# Patient Record
Sex: MALE | Race: BLACK OR AFRICAN AMERICAN
[De-identification: names, ages, dates, MRNs, and addresses within clinical notes are randomized per-mention and may not be internally consistent; named-entity substitution may affect disease eponyms.]

---

## 2019-07-21 ENCOUNTER — HOSPITAL ENCOUNTER (OUTPATIENT)
Dept: HOSPITAL 84 - D.ER | Age: 44
Setting detail: OBSERVATION
LOS: 1 days | Discharge: LEFT BEFORE DISCHARGE | End: 2019-07-22
Attending: INTERNAL MEDICINE | Admitting: INTERNAL MEDICINE
Payer: MEDICARE

## 2019-07-21 VITALS
HEIGHT: 75 IN | HEIGHT: 75 IN | BODY MASS INDEX: 29.84 KG/M2 | WEIGHT: 240 LBS | BODY MASS INDEX: 29.84 KG/M2 | WEIGHT: 240 LBS | BODY MASS INDEX: 29.84 KG/M2

## 2019-07-21 VITALS — DIASTOLIC BLOOD PRESSURE: 87 MMHG | SYSTOLIC BLOOD PRESSURE: 169 MMHG

## 2019-07-21 VITALS — SYSTOLIC BLOOD PRESSURE: 179 MMHG | DIASTOLIC BLOOD PRESSURE: 88 MMHG

## 2019-07-21 VITALS — DIASTOLIC BLOOD PRESSURE: 87 MMHG | SYSTOLIC BLOOD PRESSURE: 172 MMHG

## 2019-07-21 DIAGNOSIS — J81.1: ICD-10-CM

## 2019-07-21 DIAGNOSIS — E11.22: Primary | ICD-10-CM

## 2019-07-21 DIAGNOSIS — D63.1: ICD-10-CM

## 2019-07-21 DIAGNOSIS — I12.0: ICD-10-CM

## 2019-07-21 DIAGNOSIS — I25.10: ICD-10-CM

## 2019-07-21 DIAGNOSIS — N18.6: ICD-10-CM

## 2019-07-21 DIAGNOSIS — Z99.2: ICD-10-CM

## 2019-07-21 NOTE — NUR
PATIENT ARRIVED TO UNIT AT THIS TIME VIA GERNERY FROM ED AND ADMITTED TO ROOM
2133. PATIENT IS ALERT/ORIENTED. ABLE TO TRANSFER SELF. AV FISTULA TO LEFT
ARM. IV TO RIGHT AC. NO DISTRESS. DENIES CHEST PAIN. RESP EVEN AND UNLABORED.

## 2019-07-21 NOTE — NUR
RECIEVED UP IN BED WITH EYES OPEN AND TV ON. ALERT AND ORINTED X4. LEFT ARM
RESERVED D/T  AVF. 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 IV TO RIGHT AC
SL.. DENIES ANY NEEDS AT THIS TIME.

## 2019-07-22 VITALS — DIASTOLIC BLOOD PRESSURE: 106 MMHG | SYSTOLIC BLOOD PRESSURE: 201 MMHG

## 2019-07-22 VITALS — SYSTOLIC BLOOD PRESSURE: 201 MMHG | DIASTOLIC BLOOD PRESSURE: 99 MMHG

## 2019-07-22 VITALS — SYSTOLIC BLOOD PRESSURE: 187 MMHG | DIASTOLIC BLOOD PRESSURE: 98 MMHG

## 2019-07-22 VITALS — SYSTOLIC BLOOD PRESSURE: 240 MMHG | DIASTOLIC BLOOD PRESSURE: 75 MMHG

## 2019-07-22 VITALS — SYSTOLIC BLOOD PRESSURE: 196 MMHG | DIASTOLIC BLOOD PRESSURE: 96 MMHG

## 2019-07-22 LAB
ANION GAP SERPL CALC-SCNC: 17 MMOL/L (ref 8–16)
BASOPHILS NFR BLD AUTO: 0.2 % (ref 0–2)
BUN SERPL-MCNC: 68 MG/DL (ref 7–18)
CALCIUM SERPL-MCNC: 10.2 MG/DL (ref 8.5–10.1)
CHLORIDE SERPL-SCNC: 101 MMOL/L (ref 98–107)
CO2 SERPL-SCNC: 24.8 MMOL/L (ref 21–32)
CREAT SERPL-MCNC: 15.4 MG/DL (ref 0.6–1.3)
EOSINOPHIL NFR BLD: 2.6 % (ref 0–7)
ERYTHROCYTE [DISTWIDTH] IN BLOOD BY AUTOMATED COUNT: 15.7 % (ref 11.5–14.5)
GLUCOSE SERPL-MCNC: 110 MG/DL (ref 74–106)
HCT VFR BLD CALC: 29.1 % (ref 42–54)
HGB BLD-MCNC: 9.6 G/DL (ref 13.5–17.5)
IMM GRANULOCYTES NFR BLD: 0.1 % (ref 0–5)
LYMPHOCYTES NFR BLD AUTO: 12.2 % (ref 15–50)
MAGNESIUM SERPL-MCNC: 2.3 MG/DL (ref 1.8–2.4)
MCH RBC QN AUTO: 30 PG (ref 26–34)
MCHC RBC AUTO-ENTMCNC: 33 G/DL (ref 31–37)
MCV RBC: 90.9 FL (ref 80–100)
MONOCYTES NFR BLD: 5.5 % (ref 2–11)
NEUTROPHILS NFR BLD AUTO: 79.4 % (ref 40–80)
OSMOLALITY SERPL CALC.SUM OF ELEC: 296 MOSM/KG (ref 275–300)
PHOSPHATE SERPL-MCNC: 6.9 MG/DL (ref 2.5–4.9)
PLATELET # BLD: 277 10X3/UL (ref 130–400)
PMV BLD AUTO: 11.1 FL (ref 7.4–10.4)
POTASSIUM SERPL-SCNC: 4.8 MMOL/L (ref 3.5–5.1)
RBC # BLD AUTO: 3.2 10X6/UL (ref 4.2–6.1)
SODIUM SERPL-SCNC: 138 MMOL/L (ref 136–145)
WBC # BLD AUTO: 8.4 10X3/UL (ref 4.8–10.8)

## 2019-07-22 NOTE — NUR
B/P 201/99. PT STATES HE'S BEEN OUT OF HIS B/P MEDICATIONS. CALLED KATHLEEN GRANT ON CALL WITH NEW ORDER FOR CLONIDINE 0.1. HOUSE SUPERVISOR NOTIFIED AND
AWAITING MEDICATION.

## 2019-07-22 NOTE — NUR
RECEIVED PATIENT BACK FROM DIALYSIS AND HE REQUESTED TO BE DISCHARGED. I
EDUCATED HIM ON THE RISKS, BENEFITS AND ALTERNATIVES TO LEAVING AMA. PATIENT
SIGNED FORMS. EDUCATED PATIENT ON MEDICATIONS. DR. MARIA WAS PAGED AT 2015,
RECEIVED CALLBACK AND INFORMED HIM OF PATIENTS DECISION. PATIENT DISCHARGED.

## 2019-07-24 NOTE — MORECARE
CASE MANAGEMENT DISCHARGE SUMMARY
 
 
PATIENT: BENEDICTO WELDON                     UNIT: [id number]
ACCOUNT#: [id number]                       ADM DATE: 19
AGE: 44     : 75  SEX: M            ROOM/BED: D.2133    
AUTHOR: KRISTENDOC                             PHYSICIAN:                               
 
REFERRING PHYSICIAN: ADOLPH LIZAMA MD       
DATE OF SERVICE: 19
Discharge Plan
 
 
Patient Name: BENEDICTO WELDON
Facility: [facility]:[place]
Encounter #: [id number]
Medical Record #: [id number]
: 1975
Planned Disposition: Home
Anticipated Discharge Date: 19
 
Discharge Date: 2019
Expected LOS: 3
Initial Reviewer: [email or web address]
Initial Review Date: 2019
Generated: 19   4:08 pm 
  
 
 
 
 
 
 
 
Patient Name: BENEDICTO WELDON
 
Encounter #: [id number]
Page [id number]
 
 
 
 
 
 
 
 
**All edits/amendments must be made on the electronic document**
 
DICTATION DATE: 19 1508     : PARRISH  19 1508     
RPT#: [id number]                                ME DATE:19
                                               STATUS: DIS IN  
[facility]
[address] [place] [address]
***END OF REPORT***

## 2019-07-24 NOTE — MORECARE
CASE MANAGEMENT DISCHARGE SUMMARY
 
 
PATIENT: BENEDICTO WELDON                     UNIT: [id number]
ACCOUNT#: [id number]                       ADM DATE: 19
AGE: 44     : 75  SEX: M            ROOM/BED: D.2133    
AUTHOR: KRISTEN,DOC                             PHYSICIAN:                               
 
REFERRING PHYSICIAN: ADOLPH LIZAMA MD       
DATE OF SERVICE: 19
Discharge Plan
 
 
Patient Name: BENEDICTO WELDON
Facility: [facility]:[place]
Encounter #: [id number]
Medical Record #: [id number]
: 1975
Planned Disposition: Home
Anticipated Discharge Date: 19
 
Discharge Date: 2019
Expected LOS: 3
Initial Reviewer: [email or web address]
Initial Review Date: 2019
Generated: 19   4:24 pm 
 DCPIA - Discharge Planning Initial Assessment
 
Updated by [id number]: Geovanni John on 19   3:21 pm
*  Is the patient Alert and Oriented?
Yes
*  How many steps to enter\exit or inside your home? RAMP *  PCP LIZ TSANG *
 Pharmacy
[facility] [place]
*  Preadmission Environment
Home with Family
*  ADLs
Independent
*  Equipment
None
*  Other Equipment
[facility]
*  List name and contact numbers for known caregivers / representatives who 
currently or will assist patient after discharge:
KATIE KIMBROUGH, MOM, [phone]
*  Verbal permission to speak to the caregivers and representatives has been 
obtained from the patient.
Yes
*  Community resources currently utilized
None
 
*  Please name any agencies selected above.
NONE
*  Additional services required to return to the preadmission environment?
No
*  Can the patient safely return to the preadmission environment?
Yes
*  Has this patient been hospitalized within the prior 30 days at any 
hospital?
No
 
 
 
 
 
 
 
Last DP export: 19   2:09 p
Patient Name: BENEDICTO WELDON
Encounter #: [id number]
Page [id number]
 
 
 
 
 
 
 
 
**All edits/amendments must be made on the electronic document**
 
DICTATION DATE: 19 1524     : PARRISH  19 1524     
RPT#: [id number]                                DC DATE:19
                                               STATUS: DIS IN  
[facility]
[address] [place] [address]
***END OF REPORT***

## 2019-07-24 NOTE — MORECARE
CASE MANAGEMENT DISCHARGE SUMMARY
 
 
PATIENT: BENEDICTO WELDON                     UNIT: [id number]
ACCOUNT#: [id number]                       ADM DATE: 19
AGE: 44     : 75  SEX: M            ROOM/BED: D.2133    
AUTHOR: BLAIR PETERSON                             PHYSICIAN:                               
 
REFERRING PHYSICIAN: ADOLPH LIZAMA MD       
DATE OF SERVICE: 19
Discharge Plan
 
 
Patient Name: BENEDICTO WELDON
Facility: [facility]:[place]
Encounter #: [id number]
Medical Record #: [id number]
: 1975
Planned Disposition: Left Against Medical Advice
Anticipated Discharge Date: 19
 
Discharge Date: 2019
Expected LOS: 1
Initial Reviewer: [email or web address]
Initial Review Date: 2019
Generated: 19   4:50 pm 
  
 
 
 
 
 
 
 
 
Last DP export: 19   2:25 p
Patient Name: BENEDICTO WELDON
 
Encounter #: [id number]
Page [id number]
 
 
 
 
 
Electronically Signed by BLAIR PETERSON on 19 at 1550
 
 
 
 
 
 
**All edits/amendments must be made on the electronic document**
 
DICTATION DATE: 19 1550     : PARRISH  19 1550     
RPT#: [id number]                                DC DATE:19
                                               STATUS: DIS IN  
[facility]
[address] [place] [address]
***END OF REPORT***

## 2020-01-05 ENCOUNTER — HOSPITAL ENCOUNTER (INPATIENT)
Dept: HOSPITAL 84 - D.ER | Age: 45
LOS: 4 days | Discharge: HOME | DRG: 193 | End: 2020-01-09
Attending: INTERNAL MEDICINE | Admitting: INTERNAL MEDICINE
Payer: MEDICARE

## 2020-01-05 VITALS — DIASTOLIC BLOOD PRESSURE: 87 MMHG | SYSTOLIC BLOOD PRESSURE: 183 MMHG

## 2020-01-05 VITALS
HEIGHT: 75 IN | WEIGHT: 215 LBS | BODY MASS INDEX: 26.73 KG/M2 | BODY MASS INDEX: 26.73 KG/M2 | BODY MASS INDEX: 26.73 KG/M2 | WEIGHT: 215 LBS | HEIGHT: 75 IN

## 2020-01-05 VITALS — SYSTOLIC BLOOD PRESSURE: 199 MMHG | DIASTOLIC BLOOD PRESSURE: 102 MMHG

## 2020-01-05 VITALS — SYSTOLIC BLOOD PRESSURE: 203 MMHG | DIASTOLIC BLOOD PRESSURE: 99 MMHG

## 2020-01-05 VITALS — DIASTOLIC BLOOD PRESSURE: 95 MMHG | SYSTOLIC BLOOD PRESSURE: 184 MMHG

## 2020-01-05 VITALS — SYSTOLIC BLOOD PRESSURE: 159 MMHG | DIASTOLIC BLOOD PRESSURE: 74 MMHG

## 2020-01-05 DIAGNOSIS — J96.01: ICD-10-CM

## 2020-01-05 DIAGNOSIS — N18.6: ICD-10-CM

## 2020-01-05 DIAGNOSIS — J18.9: Primary | ICD-10-CM

## 2020-01-05 DIAGNOSIS — Z99.2: ICD-10-CM

## 2020-01-05 DIAGNOSIS — I12.0: ICD-10-CM

## 2020-01-05 DIAGNOSIS — D63.1: ICD-10-CM

## 2020-01-05 DIAGNOSIS — E11.22: ICD-10-CM

## 2020-01-05 LAB
ALBUMIN SERPL-MCNC: 3 G/DL (ref 3.4–5)
ALP SERPL-CCNC: 106 U/L (ref 46–116)
ALT SERPL-CCNC: 34 U/L (ref 10–68)
ANION GAP SERPL CALC-SCNC: 16.3 MMOL/L (ref 8–16)
APTT BLD: 32.4 SECONDS (ref 22.8–39.4)
BASOPHILS NFR BLD AUTO: 0.5 % (ref 0–2)
BILIRUB SERPL-MCNC: 0.71 MG/DL (ref 0.2–1.3)
BUN SERPL-MCNC: 32 MG/DL (ref 7–18)
CALCIUM SERPL-MCNC: 9.4 MG/DL (ref 8.5–10.1)
CHLORIDE SERPL-SCNC: 99 MMOL/L (ref 98–107)
CK MB SERPL-MCNC: 1.9 U/L (ref 0–3.6)
CK SERPL-CCNC: 170 UL (ref 21–232)
CO2 SERPL-SCNC: 25.6 MMOL/L (ref 21–32)
CREAT SERPL-MCNC: 8.8 MG/DL (ref 0.6–1.3)
EOSINOPHIL NFR BLD: 3.6 % (ref 0–7)
ERYTHROCYTE [DISTWIDTH] IN BLOOD BY AUTOMATED COUNT: 17.1 % (ref 11.5–14.5)
GLOBULIN SER-MCNC: 6 G/L
GLUCOSE SERPL-MCNC: 136 MG/DL (ref 74–106)
HCT VFR BLD CALC: 30.7 % (ref 42–54)
HGB BLD-MCNC: 9.5 G/DL (ref 13.5–17.5)
IMM GRANULOCYTES NFR BLD: 0.2 % (ref 0–5)
INR PPP: 1.29 (ref 0.85–1.17)
LYMPHOCYTES NFR BLD AUTO: 10.1 % (ref 15–50)
MCH RBC QN AUTO: 26.6 PG (ref 26–34)
MCHC RBC AUTO-ENTMCNC: 30.9 G/DL (ref 31–37)
MCV RBC: 86 FL (ref 80–100)
MONOCYTES NFR BLD: 4.8 % (ref 2–11)
NEUTROPHILS NFR BLD AUTO: 80.8 % (ref 40–80)
OSMOLALITY SERPL CALC.SUM OF ELEC: 282 MOSM/KG (ref 275–300)
PLATELET # BLD: 492 10X3/UL (ref 130–400)
PMV BLD AUTO: 10.2 FL (ref 7.4–10.4)
POTASSIUM SERPL-SCNC: 3.9 MMOL/L (ref 3.5–5.1)
PROT SERPL-MCNC: 9 G/DL (ref 6.4–8.2)
PROTHROMBIN TIME: 15.5 SECONDS (ref 11.6–15)
RBC # BLD AUTO: 3.57 10X6/UL (ref 4.2–6.1)
SODIUM SERPL-SCNC: 137 MMOL/L (ref 136–145)
TROPONIN I SERPL-MCNC: 0.06 NG/ML (ref 0–0.06)
WBC # BLD AUTO: 13.2 10X3/UL (ref 4.8–10.8)

## 2020-01-05 PROCEDURE — 5A1D70Z PERFORMANCE OF URINARY FILTRATION, INTERMITTENT, LESS THAN 6 HOURS PER DAY: ICD-10-PCS | Performed by: INTERNAL MEDICINE

## 2020-01-05 NOTE — NUR
PATIENT IS UP ROLLING AROUND IN HIS WHEEL CHAIR. HIS MOTHER IS PUSHING HIM
AROUND. HE SAID THAT HE HAD ANXIETY. HE STATES THAT IS FEELS BETTER WHEN HE IS
OUT OF THE ROOM.

## 2020-01-05 NOTE — NUR
REPORT RECEIVED. PT UP AMBULATING IN KIMBLE, RR EVEN AND UNLABORED. MOTHER
PRESENT. NO S/SX OF DISTRESS OBSERVED AT THIS TIME. WILL CTM.

## 2020-01-05 NOTE — NUR
PATIENT B/P ELEVATED. APRESOLINE GIVEN PRN AS ORDERED AT 0600. IT IS ORDERED
Q4 HOURS. HE HAS A LOT OF ANXIETY, AND REPORTS THAT THE BLOOD DRAW IS WHAT IS
MAKING HIS B/P GO UP. HE WAS VOMITING THIS MORNING AND TREATED WITH ZOFRAN. HE
IS UNHAPPY AND WANTS TO ROLL AROUND THE FLOOR BY WHEEL CHAIR. I TOLD THE
PATIENT AND HIS PAMILY THAT HE SHOULD STAY IN THE ROOM UNTIL THE APRN ROUNDS.
CALLED RICHARD العلي AND SHE IS REVIEWING [facility] MEDICATIONS NOW.

## 2020-01-05 NOTE — NUR
HIGH B/P TREATED AGAIN WITH PRN B/P MEDS. CALLED RICHARD العلي AND REPORTED
PATIENT ANXIETY. SHE ACKNOWLEDGED AND ADDITIONAL MEDICATION ORDERED FOR
ANXIETY. CALLED HOUSE SUPERVISOR TO PULL IT BECAUSE IT IS AFTER PHARMACY
CLOSED. PATIENT IS SITTING UP IN BED AND WAITING FOR THE ANTIBIOTICS TO
FINISH, THEN HE SAYS THAT HE WANTS TO RIDE AROUND THE FLOOR IN HIS WHEEL CHAIR
AGAIN. MOTHER AT BEDSIDE.

## 2020-01-06 VITALS — DIASTOLIC BLOOD PRESSURE: 90 MMHG | SYSTOLIC BLOOD PRESSURE: 176 MMHG

## 2020-01-06 VITALS — DIASTOLIC BLOOD PRESSURE: 92 MMHG | SYSTOLIC BLOOD PRESSURE: 183 MMHG

## 2020-01-06 VITALS — SYSTOLIC BLOOD PRESSURE: 185 MMHG | DIASTOLIC BLOOD PRESSURE: 91 MMHG

## 2020-01-06 LAB
ANION GAP SERPL CALC-SCNC: 13.8 MMOL/L (ref 8–16)
BASOPHILS NFR BLD AUTO: 0.4 % (ref 0–2)
BUN SERPL-MCNC: 43 MG/DL (ref 7–18)
CALCIUM SERPL-MCNC: 9.2 MG/DL (ref 8.5–10.1)
CHLORIDE SERPL-SCNC: 101 MMOL/L (ref 98–107)
CO2 SERPL-SCNC: 27.1 MMOL/L (ref 21–32)
CREAT SERPL-MCNC: 11.5 MG/DL (ref 0.6–1.3)
EOSINOPHIL NFR BLD: 5.1 % (ref 0–7)
ERYTHROCYTE [DISTWIDTH] IN BLOOD BY AUTOMATED COUNT: 17.5 % (ref 11.5–14.5)
GLUCOSE SERPL-MCNC: 150 MG/DL (ref 74–106)
HCT VFR BLD CALC: 26.7 % (ref 42–54)
HGB BLD-MCNC: 8.3 G/DL (ref 13.5–17.5)
IMM GRANULOCYTES NFR BLD: 0.3 % (ref 0–5)
LYMPHOCYTES NFR BLD AUTO: 11.4 % (ref 15–50)
MAGNESIUM SERPL-MCNC: 2.5 MG/DL (ref 1.8–2.4)
MCH RBC QN AUTO: 26.5 PG (ref 26–34)
MCHC RBC AUTO-ENTMCNC: 31.1 G/DL (ref 31–37)
MCV RBC: 85.3 FL (ref 80–100)
MONOCYTES NFR BLD: 5 % (ref 2–11)
NEUTROPHILS NFR BLD AUTO: 77.8 % (ref 40–80)
OSMOLALITY SERPL CALC.SUM OF ELEC: 289 MOSM/KG (ref 275–300)
PHOSPHATE SERPL-MCNC: 5.3 MG/DL (ref 2.5–4.9)
PLATELET # BLD: 421 10X3/UL (ref 130–400)
PMV BLD AUTO: 10.4 FL (ref 7.4–10.4)
POTASSIUM SERPL-SCNC: 3.9 MMOL/L (ref 3.5–5.1)
RBC # BLD AUTO: 3.13 10X6/UL (ref 4.2–6.1)
SODIUM SERPL-SCNC: 138 MMOL/L (ref 136–145)
TROPONIN I SERPL-MCNC: 0.07 NG/ML (ref 0–0.06)
VANCOMYCIN SERPL-MCNC: 0 UG/ML (ref 10–20)
WBC # BLD AUTO: 9.1 10X3/UL (ref 4.8–10.8)

## 2020-01-07 VITALS — SYSTOLIC BLOOD PRESSURE: 159 MMHG | DIASTOLIC BLOOD PRESSURE: 77 MMHG

## 2020-01-07 VITALS — SYSTOLIC BLOOD PRESSURE: 154 MMHG | DIASTOLIC BLOOD PRESSURE: 70 MMHG

## 2020-01-07 VITALS — DIASTOLIC BLOOD PRESSURE: 90 MMHG | SYSTOLIC BLOOD PRESSURE: 171 MMHG

## 2020-01-07 VITALS — SYSTOLIC BLOOD PRESSURE: 201 MMHG | DIASTOLIC BLOOD PRESSURE: 97 MMHG

## 2020-01-07 LAB
TROPONIN I SERPL-MCNC: 0.07 NG/ML (ref 0–0.06)
VANCOMYCIN SERPL-MCNC: 18.4 UG/ML (ref 10–20)

## 2020-01-07 NOTE — NUR
REPORT RECEIVED AND PATIENT CARE ASSUMED. PATIENT LAHYING IN BED AWAKE, ALERT
ORIENTED X 4. PATIENT IS STABLE AND VSS. PATIENT DENIES ANY NEEDES OR PAIN.
WILL CONTINUE TO MONTIOR. WIFE AT BS. SR UP X 2 BED IN LOW POSITION AND CALL
LIGHT IN REACH.

## 2020-01-07 NOTE — NUR
I CALLED GEOVANNI CARBALLO, RN VASCULAR NURSE FOR TRACY VELÁSQUEZ, RN PRIMARY NURSE FOR
A PIV. STATES THAT SHE WILL PLACE ONE.

## 2020-01-08 VITALS — DIASTOLIC BLOOD PRESSURE: 94 MMHG | SYSTOLIC BLOOD PRESSURE: 195 MMHG

## 2020-01-08 VITALS — DIASTOLIC BLOOD PRESSURE: 80 MMHG | SYSTOLIC BLOOD PRESSURE: 171 MMHG

## 2020-01-08 VITALS — DIASTOLIC BLOOD PRESSURE: 100 MMHG | SYSTOLIC BLOOD PRESSURE: 182 MMHG

## 2020-01-08 VITALS — DIASTOLIC BLOOD PRESSURE: 68 MMHG | SYSTOLIC BLOOD PRESSURE: 128 MMHG

## 2020-01-08 VITALS — SYSTOLIC BLOOD PRESSURE: 165 MMHG | DIASTOLIC BLOOD PRESSURE: 79 MMHG

## 2020-01-08 VITALS — SYSTOLIC BLOOD PRESSURE: 192 MMHG | DIASTOLIC BLOOD PRESSURE: 94 MMHG

## 2020-01-08 LAB
APPEARANCE UR: CLEAR
BILIRUB SERPL-MCNC: NEGATIVE MG/DL
COLOR UR: YELLOW
GLUCOSE SERPL-MCNC: NEGATIVE MG/DL
KETONES UR STRIP-MCNC: NEGATIVE MG/DL
NITRITE UR-MCNC: NEGATIVE MG/ML
PH UR STRIP: 8 [PH] (ref 5–6)
PROT UR-MCNC: (no result) MG/DL
SP GR UR STRIP: 1.01 (ref 1–1.02)
TROPONIN I SERPL-MCNC: 0.06 NG/ML (ref 0–0.06)
UROBILINOGEN UR-MCNC: NORMAL MG/DL
VANCOMYCIN SERPL-MCNC: 14.9 UG/ML (ref 10–20)

## 2020-01-08 NOTE — MORECARE
CASE MANAGEMENT DISCHARGE SUMMARY
 
 
PATIENT: BENEDICTO WELDON                     UNIT: [id number]
ACCOUNT#: [id number]                       ADM DATE: 20
AGE: 44     : 75  SEX: M            ROOM/BED: D.9793    
AUTHOR: KRISTEN,DOC                             PHYSICIAN:                               
 
REFERRING PHYSICIAN: SUSAN CHAMORRO DO            
DATE OF SERVICE: 20
Discharge Plan
 
 
Patient Name: BENEDICTO WELDON
Facility: [facility]:[place]
Encounter #: [id number]
Medical Record #: [id number]
: 1975
Planned Disposition: Home
Anticipated Discharge Date: 20
 
Discharge Date: 
Expected LOS: 4
Initial Reviewer: [email or web address]
Initial Review Date: 2020
Generated: 20   5:47 pm 
Comments
 
DCP- Discharge Planning
 
Updated by [id number]: Geovanni John on 20   3:41 pm CT
Patient Name: BENEDICTO WELDON                                     
Admission Status: ER   
Accout number: [id number]                              
Admission Date: 2020   
: 1975                                                        
Admission Diagnosis:   
Attending: MIKE                                                
Current LOS:  3   
  
Anticipated DC Date: 2020   
Planned Disposition: Home   
Primary Insurance: MEDICARE A & B   
  
  
Discharge Planning Comments:   
CM RECEIVED OXYGEN TESTING ORDER AND OXYGEN TESTING OF 87% ON ROOM AIR.  CM 
MET WITH PT IN ROOM TO DISCUSS DISCHARGE PLANNING AND NEEDS. PT REPORTS 
LIVING AT HOME INDEPENDENTLY WITH HIS MOTHER. PT HAS WHEELCHAIR AND LEFT 
BELOW KNEE PROSTESIS; PT'S PREFERRED MEDICAL EQUIPMENT PROVIDER IS [employer]. PT HAS NO OUTSIDE SERVICES ASSISTING IN THE HOME. PT DRIVES 
SELF TO DIALYSIS IN [facility] AT 1030AM.  CM DISCUSSED AVAILABILITY OF 
HOME HEALTH, REHAB SERVICES AND MEDICAL EQUIPMENT. PT DENIES DISCHARGE NEEDS 
OTHER THAN OXYGEN.  CM REVIEWED CHART, EXPLAINED TO PT HE DOES NOT HAVE 
QUALIFYING DIAGNOSIS FOR OXYGEN AND MAY HAVE TO PAY PRIVATELY.  PT REPORTS 
UNDERSTANDING.  CHOICE SIGNED FOR [employer].  PT REPORTS FAMILY 
WILL PICK HIM UP FOR DISCHARGE HOME. IMPORTANT MESSAGE FROM MEDICARE PROVIDED 
AND EXPLAINED.  
  
CM CALLED [employer], [phone], SPOKE TO ALFONSO WHO REPORTS 
THEY DO NOT DO PRIVATE PAY OXYGEN.  ALFONSO SUGGESTED CM CALL [facility] 
RESPIRATORY.  CM CALLED [facility], [phone], SPOKE TO 
JESS WHO INFORMED CM THAT THEY DO PRIVATE PAY OXYGEN BUT MAY BE ABLE TO GET 
INSURANCE APPROVAL AND WANTS TO REVIEW CHART.  CM SPOKE TO PT IN ROOM WHO 
CONSENTED TO REFERRAL TO [facility].  CM FAXED REFERRAL TO 
JESS [phone].  
  
CM WAITING [facility] RESPIRATORY TO COMPLETE REVIEW AND IF POSSIBLE, 
BILL MEDICARE FOR OXYGEN AND IF NOT, WORK OUT PRIVATE PAY ARRANGEMENT FOR 
HOME AND PORTABLE OXGYEN WITH PT.    
  
: Geovanni John
 DCPIA - Discharge Planning Initial Assessment
 
Updated by [id number]: Geovanni John on 20   4:23 pm
*  Is the patient Alert and Oriented?
Yes
*  How many steps to enter\exit or inside your home? NONE *  PCP DR GOLDMAN IN [place] * 
Pharmacy
[facility] IN [place]
*  Preadmission Environment
Home with Family
*  ADLs
Independent
*  Equipment
Wheelchair
*  Other Equipment
LEFT BELOW KNEE PROSTESIS
*  List name and contact numbers for known caregivers / representatives who 
currently or will assist patient after discharge:
MARTÍNEZ MCRAE, MOTHER, [phone]
*  Verbal permission to speak to the caregivers and representatives has been 
obtained from the patient.
N/A
*  Community resources currently utilized
Other
*  Please name any agencies selected above.
OUTPATIENT DIALYSIS, [place], MWF, 1030, DRIVES SELF
*  Additional services required to return to the preadmission environment?
Yes
*  Can the patient safely return to the preadmission environment?
Yes
 
*  Has this patient been hospitalized within the prior 30 days at any 
hospital?
No
 
External Providers
External Provider: [facility]
 
Next Contact Date: 2020
Service Request Date: 
Service Type: 
Resolution: 
 
Reviewer: 
Comments: 
 
 
 
 
 
 
Last DP export: 20   3:25 pm
Patient Name: BENEDICTO WELDON
Encounter #: [id number]
Page [id number]
 
 
 
 
 
Electronically Signed by BLAIR PETERSON on 20 at 1648
 
 
 
 
 
 
**All edits/amendments must be made on the electronic document**
 
DICTATION DATE: 20     : PARRISH  20     
RPT#: [id number]                                DC DATE:        
                                               STATUS: ADM IN  
[facility]
[address] [place] [address]
***END OF REPORT***

## 2020-01-08 NOTE — NUR
REPORT RECIEVED AND ROUNDING COMPLETE. PATIENT SITTING IN BED, PATIENT ASKED
ABOUT A SANDWICH, KATIE, HOUSE SUPERVISOR BROUGHT IN A SANDWICH. PATIENT SHOWS
NO S/SX OF DISTRESS AT THIS TIME. CALL LIGHT WITH IN REACH AND BED IN LOWEST
LOCKED POSITION. PAITENT STATES HE HAS NO OTHER NEEDS AT THIS TIME.

## 2020-01-08 NOTE — MORECARE
CASE MANAGEMENT DISCHARGE SUMMARY
 
 
PATIENT: BENEDICTO WELDON                     UNIT: [id number]
ACCOUNT#: [id number]                       ADM DATE: 20
AGE: 44     : 75  SEX: M            ROOM/BED: D.2103    
AUTHOR: BLAIR PETERSON                             PHYSICIAN:                               
 
REFERRING PHYSICIAN: SUSAN CHAMORRO DO            
DATE OF SERVICE: 20
Discharge Plan
 
 
Patient Name: BENEDICTO WELDON
Facility: [facility]:[place]
Encounter #: [id number]
Medical Record #: [id number]
: 1975
Planned Disposition: Home
Anticipated Discharge Date: 20
 
Discharge Date: 
Expected LOS: 4
Initial Reviewer: [email or web address]
Initial Review Date: 2020
Generated: 20   5:24 pm 
 DCPIA - Discharge Planning Initial Assessment
 
Updated by [id number]: Geovanni John on 20   4:23 pm
*  Is the patient Alert and Oriented?
Yes
*  How many steps to enter\exit or inside your home? NONE *  PCP DR GOLDMAN IN [place] * 
Pharmacy
[facility] IN [place]
*  Preadmission Environment
Home with Family
*  ADLs
Independent
*  Equipment
Wheelchair
*  Other Equipment
LEFT BELOW KNEE PROSTESIS
*  List name and contact numbers for known caregivers / representatives who 
currently or will assist patient after discharge:
MARTÍNEZ MCRAE, MOTHER, [phone]
*  Verbal permission to speak to the caregivers and representatives has been 
obtained from the patient.
N/A
*  Community resources currently utilized
Other
 
*  Please name any agencies selected above.
OUTPATIENT DIALYSIS, [place], MWF, 1030, DRIVES SELF
*  Additional services required to return to the preadmission environment?
Yes
*  Can the patient safely return to the preadmission environment?
Yes
*  Has this patient been hospitalized within the prior 30 days at any 
hospital?
No
 
 
 
 
 
 
Patient Name: BENEDICTO WELDON
Encounter #: [id number]
Page [id number]
 
 
 
 
 
Electronically Signed by BLAIR PETERSON on 20 at 1625
 
 
 
 
 
 
**All edits/amendments must be made on the electronic document**
 
DICTATION DATE: 20     : PARRISH  20     
RPT#: [id number]                                DC DATE:        
                                               STATUS: ADM IN  
[facility]
[address] [place] [address]
***END OF REPORT***

## 2020-01-09 VITALS — SYSTOLIC BLOOD PRESSURE: 177 MMHG | DIASTOLIC BLOOD PRESSURE: 91 MMHG

## 2020-01-09 VITALS — SYSTOLIC BLOOD PRESSURE: 190 MMHG | DIASTOLIC BLOOD PRESSURE: 91 MMHG

## 2020-01-09 VITALS — DIASTOLIC BLOOD PRESSURE: 78 MMHG | SYSTOLIC BLOOD PRESSURE: 185 MMHG

## 2020-01-09 LAB
TROPONIN I SERPL-MCNC: 0.05 NG/ML (ref 0–0.06)
VANCOMYCIN SERPL-MCNC: 16.1 UG/ML (ref 10–20)

## 2020-01-09 NOTE — NUR
REPORT RECEIVED FROM NIGHT SHIFT AND PATIENT DARNELL ASSUMED. PATIENT LAYING IN
BED ON BACK WITH EYES CLOSED AND BREATHING EVENLY. WILL CONTINUE WITH PLAN OF
CARE. SR UP X 2 BED IN LOW POSITION AND CALL LIGHT IN REACH.

## 2020-01-09 NOTE — NUR
Nutrition Follow-up:
Pt reports appetite improving. Ate ~75% of breakfast this AM.
Diet: Renal ADA
Wt: 215# (1/6)
Last BM: PTA
Labs reviewed
Meds noted: Renagel
-Continue current diet as tolerated.
-Need new wt; noted daily wts ordered.
-RD following.

## 2020-01-09 NOTE — NUR
PATIENT IS STABLE AND VSS. ORDERS RECEIVED FOR DC. WRITTEN AND VERBAL
INSTRUCTIONS GIVEN. PATIENT VERBAIZED UNDERSTANDING AND SIGNED DC ORDERS.
PATIENT IS NOW AWAINTING TRANSPORTATION FROM [place].

## 2020-01-09 NOTE — NUR
PATIENT IS STABLE AND VSS. IV DECD WITHOUT DIFFICULTY WITH ENTIRE CATHETER
INTACT. PRESSURE DRSG APPLIED. PATIENT TO FRONT DOOR VIA WC ACCOMPANIED BY
FAMILY AND HOSPITAL STAFF TO PRIVATE VEHICLE DRIVEN BY FAMILY MEMBER. PATIENT
IS DC HOME FOR SELF CARE.

## 2020-01-09 NOTE — MORECARE
CASE MANAGEMENT DISCHARGE SUMMARY
 
 
PATIENT: BENEDICTO WELDON                     UNIT: [id number]
ACCOUNT#: [id number]                       ADM DATE: 20
AGE: 44     : 75  SEX: M            ROOM/BED: D.2103    
AUTHOR: KRISTEN,DOC                             PHYSICIAN:                               
 
REFERRING PHYSICIAN: SUSAN CHAMORRO DO            
DATE OF SERVICE: 20
Discharge Plan
 
 
Patient Name: BENEDICTO WELDON
Facility: [facility]:[place]
Encounter #: [id number]
Medical Record #: [id number]
: 1975
Planned Disposition: Home
Anticipated Discharge Date: 20
 
Discharge Date: 
Expected LOS: 4
Initial Reviewer: [email or web address]
Initial Review Date: 2020
Generated: 20   1:13 pm 
Comments
 
DCP- Discharge Planning
 
Updated by [id number]: Geovanni John on 20  11:10 am CT
Patient Name:  BENEDICTO WELDNO   
Encounter No:  [id number]   
:  1975   
Primary Insurance:  MEDICARE A & B  
Anticipated DC Date: 2020   
Planned Disposition:  Home  
  
  
DCP follow-up note: CM CALLED [facility] RESIRATORY, [phone], SPOKE 
TO JESS WHO INFORMED CM THAT SHE RECEIVED INSURANCE APPROVAL AND OXYGEN IS 
ON THE WAY TO PT'S ROOM.  CM SPOKE TO PT IN ROOM WHO IS IN AGREEMENT WITH 
DISCHARGE HOME TODAY, FAMILY WILL .   NURSE NOTIFIED. 
  
[facility] RESPIRATORYDELIVERED PORTABLE OXYGEN TO PT'S ROOM AND WILL 
ARRANGE HOME DELIVERY OF OXGYEN WITH PT AFTER HE ARRIVES HOME TODAY.    
  
: Geovanni John
DCP- Discharge Planning
 
 
Updated by [id number]: Geovanni John on 20   3:41 pm CT
Patient Name: BENEDICTO WELDON                                     
Admission Status: ER   
Accout number: [id number]                              
Admission Date: 2020   
: 1975                                                        
Admission Diagnosis:   
Attending: MIKE                                                
Current LOS:  3   
  
Anticipated DC Date: 2020   
Planned Disposition: Home   
Primary Insurance: MEDICARE A & B   
  
  
Discharge Planning Comments:   
CM RECEIVED OXYGEN TESTING ORDER AND OXYGEN TESTING OF 87% ON ROOM AIR.  CM 
MET WITH PT IN ROOM TO DISCUSS DISCHARGE PLANNING AND NEEDS. PT REPORTS 
LIVING AT HOME INDEPENDENTLY WITH HIS MOTHER. PT HAS WHEELCHAIR AND LEFT 
BELOW KNEE PROSTESIS; PT'S PREFERRED MEDICAL EQUIPMENT PROVIDER IS [employer] 
[employer]. PT HAS NO OUTSIDE SERVICES ASSISTING IN THE HOME. PT DRIVES 
SELF TO DIALYSIS IN [facility] AT 1030AM.  CM DISCUSSED AVAILABILITY OF 
HOME HEALTH, REHAB SERVICES AND MEDICAL EQUIPMENT. PT DENIES DISCHARGE NEEDS 
OTHER THAN OXYGEN.  CM REVIEWED CHART, EXPLAINED TO PT HE DOES NOT HAVE 
QUALIFYING DIAGNOSIS FOR OXYGEN AND MAY HAVE TO PAY PRIVATELY.  PT REPORTS 
UNDERSTANDING.  CHOICE SIGNED FOR [employer].  PT REPORTS FAMILY 
WILL PICK HIM UP FOR DISCHARGE HOME. IMPORTANT MESSAGE FROM MEDICARE PROVIDED 
AND EXPLAINED.  
  
CM CALLED [employer], [phone], SPOKE TO ALFONSO WHO REPORTS 
THEY DO NOT DO PRIVATE PAY OXYGEN.  ALFONSO SUGGESTED CM CALL [facility] 
RESPIRATORY.  CM CALLED [facility], [phone], SPOKE TO 
JESS WHO INFORMED CM THAT THEY DO PRIVATE PAY OXYGEN BUT MAY BE ABLE TO GET 
INSURANCE APPROVAL AND WANTS TO REVIEW CHART.  CM SPOKE TO PT IN ROOM WHO 
CONSENTED TO REFERRAL TO [facility] RESPIRATORY.  CM FAXED REFERRAL TO 
JESS [phone].  
  
CM WAITING [facility] RESPIRATORY TO COMPLETE REVIEW AND IF POSSIBLE, 
BILL MEDICARE FOR OXYGEN AND IF NOT, WORK OUT PRIVATE PAY ARRANGEMENT FOR 
HOME AND PORTABLE OXGYEN WITH PT.    
  
: Geovanni John
 DCPIA - Discharge Planning Initial Assessment
 
Updated by [id number]: Geovanni John on 20   4:23 pm
*  Is the patient Alert and Oriented?
Yes
*  How many steps to enter\exit or inside your home? NONE *  PCP DR GOLDMAN IN [place] * 
Pharmacy
[facility] IN [place]
*  Preadmission Environment
Home with Family
 
*  ADLs
Independent
*  Equipment
Wheelchair
*  Other Equipment
LEFT BELOW KNEE PROSTESIS
*  List name and contact numbers for known caregivers / representatives who 
currently or will assist patient after discharge:
MARTÍNEZ MCRAE, MOTHER, [phone]
*  Verbal permission to speak to the caregivers and representatives has been 
obtained from the patient.
N/A
*  Community resources currently utilized
Other
*  Please name any agencies selected above.
OUTPATIENT DIALYSIS, [facility], [facility], 1030, DRIVES SELF
*  Additional services required to return to the preadmission environment?
Yes
*  Can the patient safely return to the preadmission environment?
Yes
*  Has this patient been hospitalized within the prior 30 days at any 
hospital?
No
 
 
 
 
 
Coverage Notice
 
Reviewer: [email or web address] Floresita John
 
Notice Issued Date-Time: 2020  15:00
Notice Type: Patient Choice Letter
 
Notice Delivered To: Patient
Relationship to Patient: 
Representative Name: 
 
Delivery Method: HAND - Hand Delivered
Shazia Days:
Prior Verbal Notification: 
 
Recipient Understood Notice: Yes
Recipient Signature: Yes
Med Rec Note Co-signed by Attending:
 
Coverage Notice Comment:  [employer]
Reviewer: [email or web address] Floresita John
 
Notice Issued Date-Time: 2020  15:00
Notice Type: IM Discharge Notice
 
 
Notice Delivered To: Patient
Relationship to Patient: 
Representative Name: 
 
Delivery Method: HAND - Hand Delivered
Shazia Days:
Prior Verbal Notification: 
 
Recipient Understood Notice: Yes
Recipient Signature: Yes
Med Rec Note Co-signed by Attending:
 
Coverage Notice Comment:  
 
Last DP export: 20   3:48 pm
Patient Name: BENEDICTO WELDON
Encounter #: [id number]
Page [phone]
 
 
 
 
 
Electronically Signed by BLAIR PETERSON on 20 at 1213
 
 
 
 
 
 
**All edits/amendments must be made on the electronic document**
 
DICTATION DATE: 20 1213     : PARRISH  20 1213     
RPT#: [id number]                                DC DATE:        
                                               STATUS: ADM IN  
[facility]
[address] [place] [address]
***END OF REPORT*** Gastroenterology

## 2020-01-12 ENCOUNTER — HOSPITAL ENCOUNTER (INPATIENT)
Dept: HOSPITAL 84 - D.ER | Age: 45
LOS: 3 days | Discharge: HOME | DRG: 682 | End: 2020-01-15
Attending: FAMILY MEDICINE | Admitting: FAMILY MEDICINE
Payer: MEDICARE

## 2020-01-12 VITALS
WEIGHT: 215 LBS | HEIGHT: 75 IN | BODY MASS INDEX: 26.73 KG/M2 | WEIGHT: 215 LBS | BODY MASS INDEX: 26.73 KG/M2 | HEIGHT: 75 IN | BODY MASS INDEX: 26.73 KG/M2 | BODY MASS INDEX: 26.73 KG/M2

## 2020-01-12 VITALS — SYSTOLIC BLOOD PRESSURE: 142 MMHG | DIASTOLIC BLOOD PRESSURE: 78 MMHG

## 2020-01-12 VITALS — DIASTOLIC BLOOD PRESSURE: 105 MMHG | SYSTOLIC BLOOD PRESSURE: 204 MMHG

## 2020-01-12 VITALS — DIASTOLIC BLOOD PRESSURE: 64 MMHG | SYSTOLIC BLOOD PRESSURE: 147 MMHG

## 2020-01-12 VITALS — DIASTOLIC BLOOD PRESSURE: 135 MMHG | SYSTOLIC BLOOD PRESSURE: 257 MMHG

## 2020-01-12 VITALS — DIASTOLIC BLOOD PRESSURE: 84 MMHG | SYSTOLIC BLOOD PRESSURE: 168 MMHG

## 2020-01-12 VITALS — DIASTOLIC BLOOD PRESSURE: 114 MMHG | SYSTOLIC BLOOD PRESSURE: 213 MMHG

## 2020-01-12 DIAGNOSIS — Z91.19: ICD-10-CM

## 2020-01-12 DIAGNOSIS — E11.40: ICD-10-CM

## 2020-01-12 DIAGNOSIS — J96.01: ICD-10-CM

## 2020-01-12 DIAGNOSIS — E11.22: ICD-10-CM

## 2020-01-12 DIAGNOSIS — Z99.2: ICD-10-CM

## 2020-01-12 DIAGNOSIS — I50.43: ICD-10-CM

## 2020-01-12 DIAGNOSIS — D63.1: ICD-10-CM

## 2020-01-12 DIAGNOSIS — N18.6: ICD-10-CM

## 2020-01-12 DIAGNOSIS — I12.0: Primary | ICD-10-CM

## 2020-01-12 DIAGNOSIS — J18.9: ICD-10-CM

## 2020-01-12 LAB
ALBUMIN SERPL-MCNC: 2.7 G/DL (ref 3.4–5)
ALP SERPL-CCNC: 78 U/L (ref 46–116)
ALT SERPL-CCNC: 25 U/L (ref 10–68)
ANION GAP SERPL CALC-SCNC: 12.8 MMOL/L (ref 8–16)
BASOPHILS NFR BLD AUTO: 0.2 % (ref 0–2)
BILIRUB SERPL-MCNC: 0.67 MG/DL (ref 0.2–1.3)
BUN SERPL-MCNC: 45 MG/DL (ref 7–18)
CALCIUM SERPL-MCNC: 9.6 MG/DL (ref 8.5–10.1)
CHLORIDE SERPL-SCNC: 100 MMOL/L (ref 98–107)
CO2 SERPL-SCNC: 29.4 MMOL/L (ref 21–32)
CREAT SERPL-MCNC: 12.1 MG/DL (ref 0.6–1.3)
EOSINOPHIL NFR BLD: 5.3 % (ref 0–7)
ERYTHROCYTE [DISTWIDTH] IN BLOOD BY AUTOMATED COUNT: 18.3 % (ref 11.5–14.5)
GLOBULIN SER-MCNC: 6.2 G/L
GLUCOSE SERPL-MCNC: 94 MG/DL (ref 74–106)
HCT VFR BLD CALC: 26.5 % (ref 42–54)
HGB BLD-MCNC: 8.5 G/DL (ref 13.5–17.5)
IMM GRANULOCYTES NFR BLD: 0.1 % (ref 0–5)
INR PPP: 1.23 (ref 0.85–1.17)
KETONES SERPL-MCNC: NEGATIVE MG/DL
LYMPHOCYTES NFR BLD AUTO: 12 % (ref 15–50)
MAGNESIUM SERPL-MCNC: 2.6 MG/DL (ref 1.8–2.4)
MCH RBC QN AUTO: 27.3 PG (ref 26–34)
MCHC RBC AUTO-ENTMCNC: 32.1 G/DL (ref 31–37)
MCV RBC: 85.2 FL (ref 80–100)
MONOCYTES NFR BLD: 5.3 % (ref 2–11)
NEUTROPHILS NFR BLD AUTO: 77.1 % (ref 40–80)
OSMOLALITY SERPL CALC.SUM OF ELEC: 287 MOSM/KG (ref 275–300)
PLATELET # BLD: 418 10X3/UL (ref 130–400)
PMV BLD AUTO: 10.3 FL (ref 7.4–10.4)
POTASSIUM SERPL-SCNC: 4.2 MMOL/L (ref 3.5–5.1)
PROT SERPL-MCNC: 8.9 G/DL (ref 6.4–8.2)
PROTHROMBIN TIME: 15.4 SECONDS (ref 11.6–15)
RBC # BLD AUTO: 3.11 10X6/UL (ref 4.2–6.1)
SODIUM SERPL-SCNC: 138 MMOL/L (ref 136–145)
WBC # BLD AUTO: 10.1 10X3/UL (ref 4.8–10.8)

## 2020-01-12 NOTE — NUR
ADMITTED BY WC FROM ER TO 2103 AND EFFORTS MADE FOR COMFORT AND O2 APPLIEDBED
LOW AND LOCKED CALL LIGHT GIVEN TO PT

## 2020-01-13 VITALS — SYSTOLIC BLOOD PRESSURE: 218 MMHG | DIASTOLIC BLOOD PRESSURE: 106 MMHG

## 2020-01-13 VITALS — DIASTOLIC BLOOD PRESSURE: 103 MMHG | SYSTOLIC BLOOD PRESSURE: 196 MMHG

## 2020-01-13 VITALS — DIASTOLIC BLOOD PRESSURE: 78 MMHG | SYSTOLIC BLOOD PRESSURE: 161 MMHG

## 2020-01-13 VITALS — SYSTOLIC BLOOD PRESSURE: 178 MMHG | DIASTOLIC BLOOD PRESSURE: 85 MMHG

## 2020-01-13 VITALS — DIASTOLIC BLOOD PRESSURE: 85 MMHG | SYSTOLIC BLOOD PRESSURE: 164 MMHG

## 2020-01-13 LAB
ANION GAP SERPL CALC-SCNC: 12.7 MMOL/L (ref 8–16)
BASOPHILS NFR BLD AUTO: 0.4 % (ref 0–2)
BUN SERPL-MCNC: 50 MG/DL (ref 7–18)
CALCIUM SERPL-MCNC: 9.1 MG/DL (ref 8.5–10.1)
CHLORIDE SERPL-SCNC: 98 MMOL/L (ref 98–107)
CO2 SERPL-SCNC: 29.2 MMOL/L (ref 21–32)
CREAT SERPL-MCNC: 13.3 MG/DL (ref 0.6–1.3)
EOSINOPHIL NFR BLD: 6.2 % (ref 0–7)
ERYTHROCYTE [DISTWIDTH] IN BLOOD BY AUTOMATED COUNT: 18.7 % (ref 11.5–14.5)
FERRITIN SERPL-MCNC: 668 NG/ML (ref 3–244)
GLUCOSE SERPL-MCNC: 130 MG/DL (ref 74–106)
HCT VFR BLD CALC: 24.8 % (ref 42–54)
HGB BLD-MCNC: 7.9 G/DL (ref 13.5–17.5)
IMM GRANULOCYTES NFR BLD: 0.1 % (ref 0–5)
IRON SERPL-MCNC: 30 UG/DL (ref 35–150)
LYMPHOCYTES NFR BLD AUTO: 12.7 % (ref 15–50)
MAGNESIUM SERPL-MCNC: 2.5 MG/DL (ref 1.8–2.4)
MCH RBC QN AUTO: 26.9 PG (ref 26–34)
MCHC RBC AUTO-ENTMCNC: 31.9 G/DL (ref 31–37)
MCV RBC: 84.4 FL (ref 80–100)
MONOCYTES NFR BLD: 6 % (ref 2–11)
NEUTROPHILS NFR BLD AUTO: 74.6 % (ref 40–80)
OSMOLALITY SERPL CALC.SUM OF ELEC: 286 MOSM/KG (ref 275–300)
PLATELET # BLD: 388 10X3/UL (ref 130–400)
PMV BLD AUTO: 10.1 FL (ref 7.4–10.4)
POTASSIUM SERPL-SCNC: 3.9 MMOL/L (ref 3.5–5.1)
RBC # BLD AUTO: 2.94 10X6/UL (ref 4.2–6.1)
SAO2 % BLD FROM PO2: 20 % (ref 15–55)
SODIUM SERPL-SCNC: 136 MMOL/L (ref 136–145)
TIBC SERPL-MCNC: 144 UG/DL (ref 260–445)
UIBC SERPL-MCNC: 114 UG/DL (ref 150–375)
WBC # BLD AUTO: 8.5 10X3/UL (ref 4.8–10.8)

## 2020-01-13 PROCEDURE — 5A1D70Z PERFORMANCE OF URINARY FILTRATION, INTERMITTENT, LESS THAN 6 HOURS PER DAY: ICD-10-PCS | Performed by: INTERNAL MEDICINE

## 2020-01-14 VITALS — SYSTOLIC BLOOD PRESSURE: 159 MMHG | DIASTOLIC BLOOD PRESSURE: 82 MMHG

## 2020-01-14 VITALS — SYSTOLIC BLOOD PRESSURE: 154 MMHG | DIASTOLIC BLOOD PRESSURE: 72 MMHG

## 2020-01-14 VITALS — SYSTOLIC BLOOD PRESSURE: 146 MMHG | DIASTOLIC BLOOD PRESSURE: 82 MMHG

## 2020-01-14 VITALS — DIASTOLIC BLOOD PRESSURE: 89 MMHG | SYSTOLIC BLOOD PRESSURE: 176 MMHG

## 2020-01-14 VITALS — SYSTOLIC BLOOD PRESSURE: 187 MMHG | DIASTOLIC BLOOD PRESSURE: 97 MMHG

## 2020-01-14 VITALS — SYSTOLIC BLOOD PRESSURE: 146 MMHG | DIASTOLIC BLOOD PRESSURE: 71 MMHG

## 2020-01-14 LAB
ANION GAP SERPL CALC-SCNC: 10.6 MMOL/L (ref 8–16)
BASOPHILS NFR BLD AUTO: 0.4 % (ref 0–2)
BUN SERPL-MCNC: 38 MG/DL (ref 7–18)
CALCIUM SERPL-MCNC: 8.9 MG/DL (ref 8.5–10.1)
CHLORIDE SERPL-SCNC: 101 MMOL/L (ref 98–107)
CO2 SERPL-SCNC: 31.5 MMOL/L (ref 21–32)
CREAT SERPL-MCNC: 10.9 MG/DL (ref 0.6–1.3)
EOSINOPHIL NFR BLD: 7.2 % (ref 0–7)
ERYTHROCYTE [DISTWIDTH] IN BLOOD BY AUTOMATED COUNT: 18.7 % (ref 11.5–14.5)
GLUCOSE SERPL-MCNC: 100 MG/DL (ref 74–106)
HCT VFR BLD CALC: 22.6 % (ref 42–54)
HGB BLD-MCNC: 7.2 G/DL (ref 13.5–17.5)
IMM GRANULOCYTES NFR BLD: 0.2 % (ref 0–5)
LYMPHOCYTES NFR BLD AUTO: 13.2 % (ref 15–50)
MCH RBC QN AUTO: 26.9 PG (ref 26–34)
MCHC RBC AUTO-ENTMCNC: 31.9 G/DL (ref 31–37)
MCV RBC: 84.3 FL (ref 80–100)
MONOCYTES NFR BLD: 7.8 % (ref 2–11)
NEUTROPHILS NFR BLD AUTO: 71.2 % (ref 40–80)
OSMOLALITY SERPL CALC.SUM OF ELEC: 286 MOSM/KG (ref 275–300)
PLATELET # BLD: 408 10X3/UL (ref 130–400)
PMV BLD AUTO: 10.4 FL (ref 7.4–10.4)
POTASSIUM SERPL-SCNC: 4.1 MMOL/L (ref 3.5–5.1)
RBC # BLD AUTO: 2.68 10X6/UL (ref 4.2–6.1)
SODIUM SERPL-SCNC: 139 MMOL/L (ref 136–145)
WBC # BLD AUTO: 8.2 10X3/UL (ref 4.8–10.8)

## 2020-01-14 NOTE — NUR
PT UP IN WHEELCHAIR WE SPOKE ABOUT PROCEDURE IN AM PT STATED HE STILL MAY NO
DO IT PT ASKED FOR ZOFRAN AND MED GIVEN BED LOW AND LOCKED CALL LIGHT IN REACH

## 2020-01-14 NOTE — MORECARE
CASE MANAGEMENT DISCHARGE SUMMARY
 
 
PATIENT: BENEDICTO WELDON                     UNIT: [id number]
ACCOUNT#: [id number]                       ADM DATE: 20
AGE: 44     : 75  SEX: M            ROOM/BED: D.2103    
AUTHOR: KRISTENDOC                             PHYSICIAN:                               
 
REFERRING PHYSICIAN: ARTURO FLORES MD           
DATE OF SERVICE: 20
Discharge Plan
 
 
Patient Name: BENEDICTO WELDON
Facility: [facility]:[place]
Encounter #: [id number]
Medical Record #: [id number]
: 1975
Planned Disposition: Home
Anticipated Discharge Date: 1/15/20
 
Discharge Date: 
Expected LOS: 3
Initial Reviewer: [id number]
Initial Review Date: 2020
Generated: 20   5:34 pm 
Comments
 
DCP- Discharge Planning
 
Updated by [id number]: Geovanni John on 20   3:27 pm CT
Patient Name: BENEDICTO WELDON                                     
Admission Status: ER   
Accout number: [id number]                              
Admission Date: 2020   
: 1975                                                        
Admission Diagnosis:   
Attending: DEBBIE                                                
Current LOS:  2   
  
Anticipated DC Date: 01-   
Planned Disposition: Home   
Primary Insurance: MEDICARE A & B   
  
  
Discharge Planning Comments:   
CM MET WITH PT AND MOTHER IN ROOM TO DISCUSS DISCHARGE PLANNING AND NEEDS. 
BENEDICTO WELDON provided verbal consent to discuss current and ongoing needs 
with/in the presence of: MOTHER, MARTÍNEZ.  PT REPORTS LIVING AT HOME 
INDEPENDENTLY WITH HIS MOTHER. PT HAS WALKER AND HOME / PORTABLE OXYGEN FROM 
 
[facility].  PT HAS NO OUTSIDE SERVICES ASSISTING IN THE 
HOME. PT DRIVES SELF TO DIALYSIS IN [place] ON MWF 1030AM SCHEDULE.  CM 
DISCUSSED AVAILABILITY OF HOME HEALTH, REHAB SERVICES AND MEDICAL EQUIPMENT. 
PT DENIES DISCHARGE NEEDS, REPORTS HIS MOTHER WILL PICK HIM UP FOR DISCHARGE 
HOME. IMPORTANT MESSAGE FROM MEDICARE PROVIDED AND EXPLAINED.  CM ASKED WHAT 
HAPPENED WITH FILLING DISCHARGE MEDICATION AFTER LAST VISIT.  PT REPORTS IT 
WAS TOO EXPENSIVE.  CM ASKED HOW MUCH WAS THE PRESCRIPTION, PT STATES HE SENT 
HIS MOTHER TO PICK IT UP.  PT'S MOTHER STATES SHE DID NOT KNOW, [facility] DID 
NOT TELL HER HOW MUCH THE PRESCRIPTION WAS.  CM EXPLAINED IMPORTANCE OF 
FILLING AND TAKING ANTIBIOTICS.  PT STATES HE HAS NO PRESCRIPTION COVERAGE 
NOW AND THAT HE HAS FILED FOR AND IS WAITING ON MEDICAID TO ASSIST.  CM 
PROVIDED GOOD RX CARD, INFORMED PT THAT HIS PRESCRIPTION WOULD BE $28.41.  
PT'S MOTHER THANKED CM FOR THE INFORMATION AND ASSISTANCE.  PT'S MOTHER 
COMPLAINED THAT THE HOSPITAL WOULD NOT PROVIDE HER VOUCHERS TO EAT IN THE 
[employer] [place].    PTS' MOTHER STATES THAT PATIENT IS HAVING TO TAKE HER TO 
[employer] AND IS HAVING TO BUY HER MEALS THERE. CM EXPLAINED HOSPITAL POLICY AND 
INFORMED HER THAT NO GUEST TRAYS WOULD BE PROVIDED.  PT'S MOTHER REPORTED 
UNDERSTANDING, DENIES FURHTER NEEDS.  
  
PT PLANS TO DISCHARGE HOME WITH MOM, CM PROVIDED GOOD RX CARD TO ASSIST PT 
WITH GETTING ANY NEEDED PRESCRIPTIONS AT [facility].  PT IS HOPEFUL 
THAT HIS MEDICAID WILL BE APPROVED SOON AND WILL ASSIST WITH PRESCRIPTION 
COVERAGE.  
  
  
  
: Geovanni John
 DCPIA - Discharge Planning Initial Assessment
 
Updated by [email or web address]: Geovanni John on 20   4:19 pm
*  Is the patient Alert and Oriented?
Yes
*  How many steps to enter\exit or inside your home? NONE *  PCP DR. GOLDMAN IN [place] * 
Pharmacy
[facility] [place]
*  Preadmission Environment
Home with Family
*  ADLs
Independent
*  Equipment
Other
Oxygen
Wheelchair
*  Other Equipment
LEFT BELOW KNEE PROSTHESIS  HOME AND PORTABLE OXGYEN, [facility] 
RESPIRATORY
*  List name and contact numbers for known caregivers / representatives who 
currently or will assist patient after discharge:
MARTÍNEZ MCRAE, MOTHER, [phone]
*  Verbal permission to speak to the caregivers and representatives has been 
obtained from the patient.
Yes
 
*  Community resources currently utilized
Other
*  Please name any agencies selected above.
OUTPATIENT DIALYSIS, [place], MWF, 1030, DRIVES SELF
*  Additional services required to return to the preadmission environment?
No
*  Can the patient safely return to the preadmission environment?
Yes
*  Has this patient been hospitalized within the prior 30 days at any 
hospital?
Yes
 
 
 
 
 
Coverage Notice
 
Reviewer: [id number] - Geovanni John
 
Notice Issued Date-Time: 2020  12:55
Notice Type: IM Discharge Notice
 
Notice Delivered To: Patient
Relationship to Patient: 
Representative Name: 
 
Delivery Method: HAND - Hand Delivered
Shazia Days:
Prior Verbal Notification: 
 
Recipient Understood Notice: Yes
Recipient Signature: Yes
Med Rec Note Co-signed by Attending:
 
Coverage Notice Comment:  
 
Last DP export: 20   3:22 p
Patient Name: BENEDICTO WELDON
 
Encounter #: [id number]
Page [id number]
 
 
 
 
 
Electronically Signed by DOC MCCM on 20 at 1635
 
 
 
 
 
 
**All edits/amendments must be made on the electronic document**
 
DICTATION DATE: 20     : PARRISH  20     
RPT#: [id number]                                DC DATE:        
                                               STATUS: ADM IN  
[facility]
 [place] [address]
***END OF REPORT***

## 2020-01-14 NOTE — MORECARE
CASE MANAGEMENT DISCHARGE SUMMARY
 
 
PATIENT: BENEDICTO WELDON                     UNIT: [id number]
ACCOUNT#: [id number]                       ADM DATE: 20
AGE: 44     : 75  SEX: M            ROOM/BED: D.2103    
AUTHOR: BLAIR PETERSON                             PHYSICIAN:                               
 
REFERRING PHYSICIAN: ARTURO FLORES MD           
DATE OF SERVICE: 20
Discharge Plan
 
 
Patient Name: BENEDICTO WELDON
Facility: [facility]:[place]
Encounter #: [id number]
Medical Record #: [id number]
: 1975
Planned Disposition: Home
Anticipated Discharge Date: 1/15/20
 
Discharge Date: 
Expected LOS: 3
Initial Reviewer: [email or web address]
Initial Review Date: 2020
Generated: 20   5:21 pm 
 DCPIA - Discharge Planning Initial Assessment
 
Updated by [id number]: Geovanni John on 20   4:19 pm
*  Is the patient Alert and Oriented?
Yes
*  How many steps to enter\exit or inside your home? NONE *  PCP DR. GOLDMAN IN [place] * 
Pharmacy
[facility] IN [place]
*  Preadmission Environment
Home with Family
*  ADLs
Independent
*  Equipment
Other
Oxygen
Wheelchair
*  Other Equipment
LEFT BELOW KNEE PROSTHESIS  HOME AND PORTABLE OXGYEN, [facility] 
[facility]
*  List name and contact numbers for known caregivers / representatives who 
currently or will assist patient after discharge:
MARTÍNEZ MCRAE, MOTHER, [phone]
*  Verbal permission to speak to the caregivers and representatives has been 
obtained from the patient.
 
Yes
*  Community resources currently utilized
Other
*  Please name any agencies selected above.
OUTPATIENT DIALYSIS, [place], MWF, 1030, DRIVES SELF
*  Additional services required to return to the preadmission environment?
No
*  Can the patient safely return to the preadmission environment?
Yes
*  Has this patient been hospitalized within the prior 30 days at any 
hospital?
Yes
 
 
 
 
 
Coverage Notice
 
Reviewer: [id number] - Geovanni John
 
Notice Issued Date-Time: 2020  12:55
Notice Type: IM Discharge Notice
 
Notice Delivered To: Patient
Relationship to Patient: 
Representative Name: 
 
Delivery Method: HAND - Hand Delivered
Shazia Days:
Prior Verbal Notification: 
 
Recipient Understood Notice: Yes
Recipient Signature: Yes
Med Rec Note Co-signed by Attending:
 
Coverage Notice Comment:  
Patient Name: BENEDICTO WELDON
 
Encounter #: [id number]
Page [id number]
 
 
 
 
 
Electronically Signed by BLAIR PETERSON on 20 at 1622
 
 
 
 
 
 
**All edits/amendments must be made on the electronic document**
 
DICTATION DATE: 20     : PARRISH  20     
RPT#: [id number]                                DC DATE:        
                                               STATUS: ADM IN  
[facility]
 [place] [address]
***END OF REPORT***

## 2020-01-14 NOTE — NUR
RETURN TO ROOM VIA WHEELCHAIR. FROM [facility]. REFUSE GASTRIC EMPTY SCAN ONCE
ARRIVING TO ROOM. KELLI, WITH [facility] NOTIFY PHYSICIAN. REQUESTING FOOD.
CONTINUE PLAN OF CARE AND SAFETY PRECAUTIONS.

## 2020-01-14 NOTE — NUR
RECIEVE REPORT. RESTING IN BED WITH EYES CLOSED. FAMILY AT BEDSIDE. NO SIGNS
OF DISTRESS. CONTINUE PLAN OF CARE AND SAFETY PRECAUTIONS.

## 2020-01-15 VITALS — SYSTOLIC BLOOD PRESSURE: 139 MMHG | DIASTOLIC BLOOD PRESSURE: 73 MMHG

## 2020-01-15 VITALS — SYSTOLIC BLOOD PRESSURE: 164 MMHG | DIASTOLIC BLOOD PRESSURE: 87 MMHG

## 2020-01-15 VITALS — DIASTOLIC BLOOD PRESSURE: 91 MMHG | SYSTOLIC BLOOD PRESSURE: 173 MMHG

## 2020-01-15 LAB
ANION GAP SERPL CALC-SCNC: 14.7 MMOL/L (ref 8–16)
BASOPHILS NFR BLD AUTO: 0.5 % (ref 0–2)
BUN SERPL-MCNC: 46 MG/DL (ref 7–18)
CALCIUM SERPL-MCNC: 8.3 MG/DL (ref 8.5–10.1)
CHLORIDE SERPL-SCNC: 100 MMOL/L (ref 98–107)
CO2 SERPL-SCNC: 28.8 MMOL/L (ref 21–32)
CREAT SERPL-MCNC: 13.3 MG/DL (ref 0.6–1.3)
EOSINOPHIL NFR BLD: 8.3 % (ref 0–7)
ERYTHROCYTE [DISTWIDTH] IN BLOOD BY AUTOMATED COUNT: 18.8 % (ref 11.5–14.5)
GLUCOSE SERPL-MCNC: 91 MG/DL (ref 74–106)
HCT VFR BLD CALC: 21.6 % (ref 42–54)
HGB BLD-MCNC: 6.9 G/DL (ref 13.5–17.5)
IMM GRANULOCYTES NFR BLD: 0.2 % (ref 0–5)
LYMPHOCYTES NFR BLD AUTO: 15.9 % (ref 15–50)
MCH RBC QN AUTO: 26.8 PG (ref 26–34)
MCHC RBC AUTO-ENTMCNC: 31.9 G/DL (ref 31–37)
MCV RBC: 84 FL (ref 80–100)
MONOCYTES NFR BLD: 7.7 % (ref 2–11)
NEUTROPHILS NFR BLD AUTO: 67.4 % (ref 40–80)
OSMOLALITY SERPL CALC.SUM OF ELEC: 289 MOSM/KG (ref 275–300)
PLATELET # BLD: 357 10X3/UL (ref 130–400)
PMV BLD AUTO: 9.6 FL (ref 7.4–10.4)
POTASSIUM SERPL-SCNC: 4.5 MMOL/L (ref 3.5–5.1)
RBC # BLD AUTO: 2.57 10X6/UL (ref 4.2–6.1)
SODIUM SERPL-SCNC: 139 MMOL/L (ref 136–145)
WBC # BLD AUTO: 8.3 10X3/UL (ref 4.8–10.8)

## 2020-01-15 NOTE — MORECARE
CASE MANAGEMENT DISCHARGE SUMMARY
 
 
PATIENT: BENEDICTO WELDON                     UNIT: [id number]
ACCOUNT#: [id number]                       ADM DATE: 20
AGE: 44     : 75  SEX: M            ROOM/BED: D.2103    
AUTHOR: KRISTEN,DOC                             PHYSICIAN:                               
 
REFERRING PHYSICIAN: ARTURO FLORES MD           
DATE OF SERVICE: 01/15/20
Discharge Plan
 
 
Patient Name: BENEDICTO WELDON
Facility: [facility]:[place]
Encounter #: [id number]
Medical Record #: [id number]
: 1975
Planned Disposition: Home
Anticipated Discharge Date: 1/15/20
 
Discharge Date: 01/15/2020
Expected LOS: 3
Initial Reviewer: [email or web address]
Initial Review Date: 2020
Generated: 1/15/20   6:46 pm 
Comments
 
DCP- Discharge Planning
 
Updated by [id number]: Geovanni John on 1/15/20   4:42 pm CT
Patient Name:  BENEDICTO WELDON   
Encounter No:  [id number]   
:  1975   
Primary Insurance:  MEDICARE A & B  
Anticipated DC Date: 01-   
Planned Disposition:  Home  
  
DCP follow-up note: PATIENTS SITUATION WITH LACK OF INSURANCE COVERAGE 
DISCUSSED IN MULTIDISIPLINARY TEAM MEETING, HOSPITAL CNO ADVISED TO PROVIDE 
PT MEDICATION FOR DISCHARGE HOME TO ENSURE HE GETS HIS ANTIBIOTIC THIS TIME.  
CM DIRECTOR CHELSEA AUTHORIZED.  CM CALLED [facility] IN [place], OBTAINED 
QUOTE FOR LEVAQUIN, 750MG #4, $17.17.  MEDICATION CALLED IN.  DR. CASTELLANO 
UPDATED AND ADVISED CM TO PERSONLALLY PUT THE MEDICATIONS IN PT'S HAND FOR 
DISCHARGE.  CM ADMINISTRATIVE ASSITANT PICKED UP MEDICATION AND PROVIDED TO 
CM.  CM PROVIDED TO PT.  PT AND PT'S MOTHER DENIED FURTHER DISCHARGE NEEDS. 
 NURSE NOTIFIED.  
  
Geovanni John, CASE MANAGEMENT
DCP- Discharge Planning
 
 
Updated by [id number]: Geovanni John on 20   3:27 pm CT
Patient Name: BENEDICTO WELDON                                     
Admission Status: ER   
Accout number: [id number]                              
Admission Date: 2020   
: 1975                                                        
Admission Diagnosis:   
Attending: DEBBIE                                                
Current LOS:  2   
  
Anticipated DC Date: 01-   
Planned Disposition: Home   
Primary Insurance: MEDICARE A & B   
  
  
Discharge Planning Comments:   
CM MET WITH PT AND MOTHER IN ROOM TO DISCUSS DISCHARGE PLANNING AND NEEDS. 
BENEDICTO WELDON provided verbal consent to discuss current and ongoing needs 
with/in the presence of: MOTHER, MARTÍNEZ.  PT REPORTS LIVING AT HOME 
INDEPENDENTLY WITH HIS MOTHER. PT HAS WALKER AND HOME / PORTABLE OXYGEN FROM 
[facility] RESPIRAT[facility].  PT HAS NO OUTSIDE SERVICES ASSISTING IN THE 
HOME. PT DRIVES SELF TO DIALYSIS IN [place] ON MWF 1030AM SCHEDULE.  CM 
DISCUSSED AVAILABILITY OF HOME HEALTH, REHAB SERVICES AND MEDICAL EQUIPMENT. 
PT DENIES DISCHARGE NEEDS, REPORTS HIS MOTHER WILL PICK HIM UP FOR DISCHARGE 
HOME. IMPORTANT MESSAGE FROM MEDICARE PROVIDED AND EXPLAINED.  CM ASKED WHAT 
HAPPENED WITH FILLING DISCHARGE MEDICATION AFTER LAST VISIT.  PT REPORTS IT 
WAS TOO EXPENSIVE.  CM ASKED HOW MUCH WAS THE PRESCRIPTION, PT STATES HE SENT 
HIS MOTHER TO PICK IT UP.  PT'S MOTHER STATES SHE DID NOT KNOW, [facility] DID 
NOT TELL HER HOW MUCH THE PRESCRIPTION WAS.  CM EXPLAINED IMPORTANCE OF 
FILLING AND TAKING ANTIBIOTICS.  PT STATES HE HAS NO PRESCRIPTION COVERAGE 
NOW AND THAT HE HAS FILED FOR AND IS WAITING ON MEDICAID TO ASSIST.  CM 
PROVIDED GOOD RX CARD, INFORMED PT THAT HIS PRESCRIPTION WOULD BE $28.41.  
PT'S MOTHER THANKED CM FOR THE INFORMATION AND ASSISTANCE.  PT'S MOTHER 
COMPLAINED THAT THE HOSPITAL WOULD NOT PROVIDE HER VOUCHERS TO EAT IN THE 
[employer] [place].    PTS' MOTHER STATES THAT PATIENT IS HAVING TO TAKE HER TO 
[employer] AND IS HAVING TO BUY HER MEALS THERE. CM EXPLAINED HOSPITAL POLICY AND 
INFORMED HER THAT NO GUEST TRAYS WOULD BE PROVIDED.  PT'S MOTHER REPORTED 
UNDERSTANDING, DENIES FURHTER NEEDS.  
  
PT PLANS TO DISCHARGE HOME WITH MOM, CM PROVIDED GOOD RX CARD TO ASSIST PT 
WITH GETTING ANY NEEDED PRESCRIPTIONS AT [facility].  PT IS HOPEFUL 
THAT HIS MEDICAID WILL BE APPROVED SOON AND WILL ASSIST WITH PRESCRIPTION 
COVERAGE.  
  
  
  
: Geovanni John
 DCA - Discharge Planning Initial Assessment
 
Updated by [id number]: Geovanni John on 20   4:19 pm
*  Is the patient Alert and Oriented?
 
Yes
*  How many steps to enter\exit or inside your home? NONE *  PCP DR. GOLDMAN IN [place] * 
Pharmacy
[facility] IN [place]
*  Preadmission Environment
Home with Family
*  ADLs
Independent
*  Equipment
Other
Oxygen
Wheelchair
*  Other Equipment
LEFT BELOW KNEE PROSTHESIS  HOME AND PORTABLE OXGYEN, [facility] 
RESPIRATORY
*  List name and contact numbers for known caregivers / representatives who 
currently or will assist patient after discharge:
MARTÍNEZ MCRAE, MOTHER, [phone]
*  Verbal permission to speak to the caregivers and representatives has been 
obtained from the patient.
Yes
*  Community resources currently utilized
Other
*  Please name any agencies selected above.
OUTPATIENT DIALYSIS, [place], [place], 1030, DRIVES SELF
*  Additional services required to return to the preadmission environment?
No
*  Can the patient safely return to the preadmission environment?
Yes
*  Has this patient been hospitalized within the prior 30 days at any 
hospital?
Yes
 
 
 
 
 
Coverage Notice
 
Reviewer: [id number] [place]ita John
 
Notice Issued Date-Time: 2020  12:55
Notice Type: IM Discharge Notice
 
Notice Delivered To: Patient
Relationship to Patient: 
Representative Name: 
 
Delivery Method: HAND - Hand Delivered
Shazia Days:
Prior Verbal Notification: 
 
 
Recipient Understood Notice: Yes
Recipient Signature: Yes
Med Rec Note Co-signed by Attending:
 
Coverage Notice Comment:  
 
Last DP export: 20   3:35 p
Patient Name: BENEDICTO WELDON
Encounter #: [id number]
Page [phone]
 
 
 
 
 
Electronically Signed by BLAIR PETERSON on 01/15/20 at 1747
 
 
 
 
 
 
**All edits/amendments must be made on the electronic document**
 
DICTATION DATE: 01/15/20 1746     : PARRISH  01/15/20 1746     
RPT#: [id number]                                DC DATE:01/15/20
                                               STATUS: DIS IN  
[facility]
 [place] [address]
***END OF REPORT***

## 2020-01-15 NOTE — NUR
PATIENT REFUSED THE NUCLEAR MEDICINE GASTRIC EMPTYING SCAN AGAIN PER SAROJ.
EXAM IS CANCELLED.  SPOKE TO KAMLESH AND LET HER KNOW THE PATIENT REFUSED
YESTERDAY AND AGAIN TODAY.

## 2020-01-15 NOTE — NUR
PT'S DISCHARGE INSTRUCTIONS REVIEWED WITH HIM AND MOTHER.  RT IN ROOM LOADING
OXYGEN TANK FOR TRANSPORT HOME.  IV REMOVED.  ANTIBIOTICS WITH PT AND
INSTRUCTED HOW TO TAKE.

## 2020-01-15 NOTE — NUR
I SPOKE WITH PT AT THIS TIME TO CONFIRM THAT HE DID NOT WANT TO RECIEVE BLOOD
PT WAS ADIMENT AFTER I EXPLAIN TH E CRITICAL LEVEL THAT HE WOULD NOT RECIEVE
BLOOD

## 2021-06-08 ENCOUNTER — HOSPITAL ENCOUNTER (OUTPATIENT)
Dept: HOSPITAL 84 - D.ER | Age: 46
Setting detail: OBSERVATION
Discharge: LEFT BEFORE DISCHARGE | End: 2021-06-08
Attending: FAMILY MEDICINE | Admitting: FAMILY MEDICINE
Payer: MEDICARE

## 2021-06-08 VITALS
BODY MASS INDEX: 27.65 KG/M2 | WEIGHT: 215.45 LBS | BODY MASS INDEX: 27.65 KG/M2 | HEIGHT: 74 IN | HEIGHT: 74 IN | WEIGHT: 215.45 LBS

## 2021-06-08 VITALS — SYSTOLIC BLOOD PRESSURE: 166 MMHG | DIASTOLIC BLOOD PRESSURE: 88 MMHG

## 2021-06-08 VITALS — DIASTOLIC BLOOD PRESSURE: 86 MMHG | SYSTOLIC BLOOD PRESSURE: 150 MMHG

## 2021-06-08 DIAGNOSIS — R07.9: Primary | ICD-10-CM

## 2021-06-08 DIAGNOSIS — I13.2: ICD-10-CM

## 2021-06-08 DIAGNOSIS — I50.9: ICD-10-CM

## 2021-06-08 DIAGNOSIS — E78.5: ICD-10-CM

## 2021-06-08 DIAGNOSIS — N18.6: ICD-10-CM

## 2021-06-08 DIAGNOSIS — E11.65: ICD-10-CM

## 2021-06-08 DIAGNOSIS — E11.22: ICD-10-CM

## 2021-06-08 LAB
ALBUMIN SERPL-MCNC: 3.1 G/DL (ref 3.4–5)
ALP SERPL-CCNC: 81 U/L (ref 30–120)
ALT SERPL-CCNC: 14 U/L (ref 10–68)
ANION GAP SERPL CALC-SCNC: 16.3 MMOL/L (ref 8–16)
APTT BLD: 30.1 SECONDS (ref 22.8–39.4)
BASOPHILS NFR BLD AUTO: 0.9 % (ref 0–2)
BILIRUB SERPL-MCNC: 0.28 MG/DL (ref 0.2–1.3)
BUN SERPL-MCNC: 50 MG/DL (ref 7–18)
CALCIUM SERPL-MCNC: 9.4 MG/DL (ref 8.5–10.1)
CHLORIDE SERPL-SCNC: 95 MMOL/L (ref 98–107)
CK MB SERPL-MCNC: 2.8 U/L (ref 0–3.6)
CK SERPL-CCNC: 99 UL (ref 21–232)
CO2 SERPL-SCNC: 26.7 MMOL/L (ref 21–32)
CREAT SERPL-MCNC: 15.8 MG/DL (ref 0.6–1.3)
EOSINOPHIL NFR BLD: 10 % (ref 0–7)
ERYTHROCYTE [DISTWIDTH] IN BLOOD BY AUTOMATED COUNT: 16.6 % (ref 11.5–14.5)
GLOBULIN SER-MCNC: 6 G/L
GLUCOSE SERPL-MCNC: 227 MG/DL (ref 74–106)
HCT VFR BLD CALC: 36.8 % (ref 42–54)
HGB BLD-MCNC: 12 G/DL (ref 13.5–17.5)
INR PPP: 1.16 (ref 0.85–1.17)
LYMPHOCYTES # BLD: 1.5 10X3/UL (ref 1.32–3.57)
LYMPHOCYTES NFR BLD AUTO: 16.5 % (ref 15–50)
MAGNESIUM SERPL-MCNC: 2.2 MG/DL (ref 1.8–2.4)
MCH RBC QN AUTO: 31.1 PG (ref 26–34)
MCHC RBC AUTO-ENTMCNC: 32.6 G/DL (ref 31–37)
MCV RBC: 95.5 FL (ref 80–100)
MONOCYTES NFR BLD: 10.5 % (ref 2–11)
NEUTROPHILS # BLD: 5.7 10X3/UL (ref 1.78–5.38)
NEUTROPHILS NFR BLD AUTO: 62.1 % (ref 40–80)
OSMOLALITY SERPL CALC.SUM OF ELEC: 287 MOSM/KG (ref 275–300)
PLATELET # BLD: 275 10X3/UL (ref 130–400)
PMV BLD AUTO: 9.1 FL (ref 7.4–10.4)
POTASSIUM SERPL-SCNC: 4 MMOL/L (ref 3.5–5.1)
PROT SERPL-MCNC: 9.1 G/DL (ref 6.4–8.2)
PROTHROMBIN TIME: 13.7 SECONDS (ref 11.6–15)
RBC # BLD AUTO: 3.86 10X6/UL (ref 4.2–6.1)
SODIUM SERPL-SCNC: 134 MMOL/L (ref 136–145)
TROPONIN I SERPL-MCNC: 0.4 NG/ML (ref 0–0.06)
WBC # BLD AUTO: 9.2 10X3/UL (ref 4.8–10.8)

## 2023-01-01 NOTE — NUR
MOTHER AT BEDSIDE, UPDATED HER ON PLAN FOR CARDIOLOGY TO DISCHARGE AND
RICHARD BUTLER TO DISCHARGE FOLLOWING. FAMILY AND PT VOICED UNDERSTANDING.
PT AND FAMILY ASKING FOR DISCHARGE INSTRUCTIONS, EXPLAINED THAT UNABLE TO
PROVIDE D/T DISCHARGE NOT ORDERED AT THIS TIME. EXPLAINED AMA FORM TO PT AND
FAMILY. PT SIGNED, RICHARD WHITLEY NOTIFIED.
REPORT RECEIVED FROM HAILE CHANDLER.
Parent(s)